# Patient Record
Sex: FEMALE | Race: BLACK OR AFRICAN AMERICAN | Employment: UNEMPLOYED | URBAN - METROPOLITAN AREA
[De-identification: names, ages, dates, MRNs, and addresses within clinical notes are randomized per-mention and may not be internally consistent; named-entity substitution may affect disease eponyms.]

---

## 2019-08-13 ENCOUNTER — TELEPHONE (OUTPATIENT)
Dept: PRIMARY CARE CLINIC | Age: 59
End: 2019-08-13

## 2019-08-13 ENCOUNTER — HOSPITAL ENCOUNTER (OUTPATIENT)
Age: 59
Discharge: HOME | End: 2019-08-13

## 2019-08-13 ENCOUNTER — OFFICE VISIT (OUTPATIENT)
Dept: PRIMARY CARE CLINIC | Age: 59
End: 2019-08-13

## 2019-08-13 VITALS
HEIGHT: 70 IN | WEIGHT: 188 LBS | HEART RATE: 67 BPM | BODY MASS INDEX: 26.92 KG/M2 | RESPIRATION RATE: 16 BRPM | DIASTOLIC BLOOD PRESSURE: 72 MMHG | SYSTOLIC BLOOD PRESSURE: 110 MMHG | OXYGEN SATURATION: 100 %

## 2019-08-13 DIAGNOSIS — E55.9 VITAMIN D DEFICIENCY: ICD-10-CM

## 2019-08-13 DIAGNOSIS — E53.8 VITAMIN B12 DEFICIENCY: ICD-10-CM

## 2019-08-13 DIAGNOSIS — F51.01 PRIMARY INSOMNIA: ICD-10-CM

## 2019-08-13 DIAGNOSIS — Z12.39 SCREENING FOR BREAST CANCER: ICD-10-CM

## 2019-08-13 DIAGNOSIS — E05.90 HYPERTHYROIDISM: ICD-10-CM

## 2019-08-13 DIAGNOSIS — E11.9 CONTROLLED TYPE 2 DIABETES MELLITUS WITHOUT COMPLICATION, WITHOUT LONG-TERM CURRENT USE OF INSULIN (HCC): ICD-10-CM

## 2019-08-13 DIAGNOSIS — I10 ESSENTIAL HYPERTENSION: Primary | ICD-10-CM

## 2019-08-13 DIAGNOSIS — Z12.11 SCREENING FOR COLON CANCER: ICD-10-CM

## 2019-08-13 DIAGNOSIS — F33.1 MODERATE EPISODE OF RECURRENT MAJOR DEPRESSIVE DISORDER (HCC): ICD-10-CM

## 2019-08-13 LAB
FOLATE: 17.9 NG/ML
TSH SERPL DL<=0.05 MIU/L-ACNC: 1.15 MIU/L (ref 0.3–5)
VITAMIN B-12: 1449 PG/ML (ref 232–1245)
VITAMIN D 25-HYDROXY: 33.1 NG/ML (ref 30–100)

## 2019-08-13 PROCEDURE — 82607 VITAMIN B-12: CPT

## 2019-08-13 PROCEDURE — 82746 ASSAY OF FOLIC ACID SERUM: CPT

## 2019-08-13 PROCEDURE — 84443 ASSAY THYROID STIM HORMONE: CPT

## 2019-08-13 PROCEDURE — 82306 VITAMIN D 25 HYDROXY: CPT

## 2019-08-13 PROCEDURE — 36415 COLL VENOUS BLD VENIPUNCTURE: CPT

## 2019-08-13 PROCEDURE — 99204 OFFICE O/P NEW MOD 45 MIN: CPT | Performed by: INTERNAL MEDICINE

## 2019-08-13 RX ORDER — LOSARTAN POTASSIUM AND HYDROCHLOROTHIAZIDE 12.5; 5 MG/1; MG/1
1 TABLET ORAL DAILY
COMMUNITY

## 2019-08-13 RX ORDER — CARVEDILOL 12.5 MG/1
12.5 TABLET ORAL DAILY
COMMUNITY

## 2019-08-13 RX ORDER — LANOLIN ALCOHOL/MO/W.PET/CERES
3 CREAM (GRAM) TOPICAL NIGHTLY PRN
Qty: 90 TABLET | Refills: 1 | Status: SHIPPED | OUTPATIENT
Start: 2019-08-13

## 2019-08-13 RX ORDER — ESCITALOPRAM OXALATE 20 MG/1
20 TABLET ORAL DAILY
COMMUNITY

## 2019-08-13 ASSESSMENT — PATIENT HEALTH QUESTIONNAIRE - PHQ9
SUM OF ALL RESPONSES TO PHQ QUESTIONS 1-9: 0
2. FEELING DOWN, DEPRESSED OR HOPELESS: 0
SUM OF ALL RESPONSES TO PHQ QUESTIONS 1-9: 0
SUM OF ALL RESPONSES TO PHQ9 QUESTIONS 1 & 2: 0
1. LITTLE INTEREST OR PLEASURE IN DOING THINGS: 0

## 2019-08-17 ASSESSMENT — ENCOUNTER SYMPTOMS
SINUS PAIN: 0
VOMITING: 0
SHORTNESS OF BREATH: 0
ABDOMINAL PAIN: 0
SINUS PRESSURE: 0
CONSTIPATION: 0
NAUSEA: 0
COUGH: 0
DIARRHEA: 0
WHEEZING: 0
BACK PAIN: 0
ABDOMINAL DISTENTION: 0

## 2019-08-18 NOTE — PROGRESS NOTES
[address] [facility] CARE  [facility] [phone] [address]  Dept: [phone]  Dept Fax: [phone]    Syd Sorenson is a 63 y.o. female who presents today for her medical conditions/complaints as noted below. Chief Complaint   Patient presents with    New Patient     PT C/O HEADACHE        HPI:     This is a 55-year-old female who is here to establish care. She has past medical history of essential hypertension controlled with Hyzaar and Coreg and today blood pressure is at 110/72 and pulse at 67. She has past medical history of type 2 diabetes she is on metformin 500 mg twice daily. He takes Lexapro for depression and anxiety. She reports that she is having trouble with sleeping at night and she has not tried any medications at this time. No other complaints or concerns. There is some health maintenance labs do. No results found for: LABA1C          ( goal A1C is < 7)   No results found for: LABMICR  No results found for: LDLCHOLESTEROL, LDLCALC    (goal LDL is <100)   No results found for: AST, ALT, BUN  BP Readings from Last 3 Encounters:   08/13/19 110/72          (goal 120/80)    Past Medical History:   Diagnosis Date    Anxiety     Diabetes mellitus ([facility] [address]     Hypertension       History reviewed. No pertinent surgical history.     Family History   Problem Relation Age of Onset    Diabetes Mother     Hypertension Mother        Social History     Tobacco Use    Smoking status: Never Smoker    Smokeless tobacco: Never Used   Substance Use Topics    Alcohol use: Not Currently      Current Outpatient Medications   Medication Sig Dispense Refill    metFORMIN (GLUCOPHAGE) 500 MG tablet Take 500 mg by mouth 2 times daily (with meals)      carvedilol (COREG) 12.5 MG tablet Take 12.5 mg by mouth daily      losartan-hydrochlorothiazide (HYZAAR) 50-12.5 MG per tablet Take 1 tablet by mouth daily      escitalopram ([facility]  metformin 500 mg  bid    4. Primary insomnia  - melatonin (RA MELATONIN) 3 MG TABS tablet; Take 1 tablet by mouth nightly as needed (insomnia)  Dispense: 90 tablet; Refill: 1    5. Screening for breast cancer  - VERO DIGITAL SCREEN W CAD BILATERAL; Future    6. Screening for colon cancer  - Cologuard (For External Results Only); Future    7. Hyperthyroidism  - TSH With Reflex Ft4; Future    8. Vitamin D deficiency  - Vitamin D 25 Hydroxy; Future    9. Vitamin B12 deficiency  - Vitamin B12 & Folate; Future            Diagnosis Orders   1. Essential hypertension     2. Moderate episode of recurrent major depressive disorder ([facility] [address]     3. Controlled type 2 diabetes mellitus without complication, without long-term current use of insulin ([facility]     4. Primary insomnia  melatonin (RA MELATONIN) 3 MG TABS tablet   5. Screening for breast cancer  VERO DIGITAL SCREEN W CAD BILATERAL   6. Screening for colon cancer  Cologuard (For External Results Only)   7. Hyperthyroidism  TSH With Reflex Ft4   8. Vitamin D deficiency  Vitamin D 25 Hydroxy   9. Vitamin B12 deficiency  Vitamin B12 & Folate           Plan:      Return in about 3 months (around 11/13/2019) for Routine follow-up. Orders Placed This Encounter   Procedures    Cologuard (For External Results Only)     This test is performed by an external laboratory and is used for result attachment only. It is required that this order requisition be faxed to: [employer] @ [phone]. See [email or web address] for further information.      Standing Status:   Future     Standing Expiration Date:   8/13/2020    VERO DIGITAL SCREEN W CAD BILATERAL     Standing Status:   Future     Standing Expiration Date:   8/12/2020     Order Specific Question:   Reason for exam:     Answer:   screening    TSH With Reflex Ft4     Standing Status:   Future     Number of Occurrences:   1     Standing Expiration Date:   8/13/2020    Vitamin D 25 Hydroxy     Standing Status:   Future